# Patient Record
(demographics unavailable — no encounter records)

---

## 2025-01-23 NOTE — PLAN
[FreeTextEntry1] : Well woman visit completed STD screening completed  Plan for Mirena IUD placement rx for cytotec given to patient --instructions reviewed.

## 2025-01-23 NOTE — PHYSICAL EXAM
[Chaperone Present] : A chaperone was present in the examining room during all aspects of the physical examination [[id number]] : A chaperone was present during the pelvic exam. [Appropriately responsive] : appropriately responsive [Alert] : alert [No Acute Distress] : no acute distress [Soft] : soft [Non-tender] : non-tender [Non-distended] : non-distended [No HSM] : No HSM [No Lesions] : no lesions [No Mass] : no mass [Oriented x3] : oriented x3 [Examination Of The Breasts] : a normal appearance [No Masses] : no breast masses were palpable [Labia Majora] : normal [Labia Minora] : normal [Normal] : normal [Uterine Adnexae] : normal [FreeTextEntry8] : negative

## 2025-01-23 NOTE — HISTORY OF PRESENT ILLNESS
[Y] : Patient is sexually active [N] : Patient denies prior pregnancies [Menarche Age: ____] : age at menarche was [unfilled] [Currently Active] : currently active [Men] : men [No] : No [Condoms] : Condoms [TextBox_4] : Well woman visit No complaints.  [PapSmeardate] : 2023 [TextBox_31] : Patient states normal result  [LMPDate] : 01/12/25 [MensesFreq] : 28 [MensesLength] : 5 [PGHxTotal] : 0 [FreeTextEntry1] : 01/12/25 [FreeTextEntry2] : Single in relationship [FreeTextEntry4] : Denies abuse

## 2025-02-12 NOTE — PROCEDURE
Duration Of Freeze Thaw-Cycle (Seconds): 20 [IUD Placement] : intrauterine device (IUD) placement [Prevention of Pregnancy] : prevention of pregnancy Medical Necessity Information: It is in your best interest to select a reason for this procedure from the list below. All of these items fulfill various CMS LCD requirements except the new and changing color options. [Risks] : risks [Benefits] : benefits [Alternatives] : alternatives [No Premedication] : No premedication Add 52 Modifier (Optional): no [Uterus Sounded to ___cm] : sounded to [unfilled]Ucm [Tenaculum] : a single toothed tenaculum [Easy Passage] : allowed easy passage of a uterine sound without dilation [Post Placement Transvag. US] : post placement transvaginal ultrasound completed [Mirena IUD] : The Mirena IUD was inserted past the internal cervical os. The IUD was then gently inserted upwards toward the fundus.  The IUD strings were cut to an appropriate length. [Lot Number: ___] : IUD lot number: [unfilled] Aperture Size (Optional): C [Tolerated Well] : the patient tolerated the procedure well [No Complications] : there were no complications [None] : no post-procedure medications given [de-identified] : Mirena Duration Of Freeze Thaw-Cycle (Seconds): 15-20 Number Of Freeze-Thaw Cycles: 2 freeze-thaw cycles Detail Level: Detailed Include Z78.9 (Other Specified Conditions Influencing Health Status) As An Associated Diagnosis?: Yes